# Patient Record
Sex: FEMALE | Race: WHITE | NOT HISPANIC OR LATINO | Employment: OTHER | URBAN - METROPOLITAN AREA
[De-identification: names, ages, dates, MRNs, and addresses within clinical notes are randomized per-mention and may not be internally consistent; named-entity substitution may affect disease eponyms.]

---

## 2018-07-03 ENCOUNTER — TRANSCRIBE ORDERS (OUTPATIENT)
Dept: ADMINISTRATIVE | Facility: HOSPITAL | Age: 83
End: 2018-07-03

## 2018-07-03 DIAGNOSIS — M81.0 OSTEOPOROSIS, UNSPECIFIED OSTEOPOROSIS TYPE, UNSPECIFIED PATHOLOGICAL FRACTURE PRESENCE: Primary | ICD-10-CM

## 2018-07-17 ENCOUNTER — HOSPITAL ENCOUNTER (OUTPATIENT)
Dept: BONE DENSITY | Facility: MEDICAL CENTER | Age: 83
Discharge: HOME | End: 2018-07-17
Payer: COMMERCIAL

## 2018-07-17 DIAGNOSIS — M81.0 OSTEOPOROSIS, UNSPECIFIED OSTEOPOROSIS TYPE, UNSPECIFIED PATHOLOGICAL FRACTURE PRESENCE: ICD-10-CM

## 2018-07-17 PROCEDURE — 77080 DXA BONE DENSITY AXIAL: CPT

## 2020-01-21 ENCOUNTER — APPOINTMENT (EMERGENCY)
Dept: CT IMAGING | Facility: HOSPITAL | Age: 85
End: 2020-01-21
Payer: COMMERCIAL

## 2020-01-21 ENCOUNTER — HOSPITAL ENCOUNTER (OUTPATIENT)
Facility: HOSPITAL | Age: 85
Setting detail: OBSERVATION
Discharge: HOME | End: 2020-01-22
Attending: EMERGENCY MEDICINE | Admitting: HOSPITALIST
Payer: COMMERCIAL

## 2020-01-21 DIAGNOSIS — I16.0 HYPERTENSIVE URGENCY: ICD-10-CM

## 2020-01-21 DIAGNOSIS — E16.2 HYPOGLYCEMIA: ICD-10-CM

## 2020-01-21 DIAGNOSIS — F02.80 LATE ONSET ALZHEIMER'S DISEASE WITHOUT BEHAVIORAL DISTURBANCE (HCC): ICD-10-CM

## 2020-01-21 DIAGNOSIS — D32.9 MENINGIOMA (HCC): ICD-10-CM

## 2020-01-21 DIAGNOSIS — G93.40 ACUTE ENCEPHALOPATHY: ICD-10-CM

## 2020-01-21 DIAGNOSIS — G30.1 LATE ONSET ALZHEIMER'S DISEASE WITHOUT BEHAVIORAL DISTURBANCE (HCC): ICD-10-CM

## 2020-01-21 DIAGNOSIS — R41.82 AMS (ALTERED MENTAL STATUS): Primary | ICD-10-CM

## 2020-01-21 LAB
ANION GAP SERPL CALCULATED.3IONS-SCNC: 10 MMOL/L (ref 4–13)
BACTERIA UR QL AUTO: ABNORMAL /HPF
BASOPHILS # BLD AUTO: 0.02 THOUSANDS/ΜL (ref 0–0.1)
BASOPHILS NFR BLD AUTO: 0 % (ref 0–1)
BILIRUB UR QL STRIP: NEGATIVE
BUN SERPL-MCNC: 29 MG/DL (ref 5–25)
CALCIUM SERPL-MCNC: 8.6 MG/DL (ref 8.3–10.1)
CHLORIDE SERPL-SCNC: 103 MMOL/L (ref 100–108)
CLARITY UR: CLEAR
CO2 SERPL-SCNC: 29 MMOL/L (ref 21–32)
COLOR UR: YELLOW
CREAT SERPL-MCNC: 1.17 MG/DL (ref 0.6–1.3)
EOSINOPHIL # BLD AUTO: 0.1 THOUSAND/ΜL (ref 0–0.61)
EOSINOPHIL NFR BLD AUTO: 1 % (ref 0–6)
ERYTHROCYTE [DISTWIDTH] IN BLOOD BY AUTOMATED COUNT: 14.3 % (ref 11.6–15.1)
GFR SERPL CREATININE-BSD FRML MDRD: 42 ML/MIN/1.73SQ M
GLUCOSE SERPL-MCNC: 115 MG/DL (ref 65–140)
GLUCOSE SERPL-MCNC: 152 MG/DL (ref 65–140)
GLUCOSE SERPL-MCNC: 87 MG/DL (ref 65–140)
GLUCOSE UR STRIP-MCNC: NEGATIVE MG/DL
HCT VFR BLD AUTO: 39.8 % (ref 34.8–46.1)
HGB BLD-MCNC: 12 G/DL (ref 11.5–15.4)
HGB UR QL STRIP.AUTO: ABNORMAL
IMM GRANULOCYTES # BLD AUTO: 0.05 THOUSAND/UL (ref 0–0.2)
IMM GRANULOCYTES NFR BLD AUTO: 0 % (ref 0–2)
KETONES UR STRIP-MCNC: NEGATIVE MG/DL
LEUKOCYTE ESTERASE UR QL STRIP: NEGATIVE
LYMPHOCYTES # BLD AUTO: 1.65 THOUSANDS/ΜL (ref 0.6–4.47)
LYMPHOCYTES NFR BLD AUTO: 15 % (ref 14–44)
MCH RBC QN AUTO: 26 PG (ref 26.8–34.3)
MCHC RBC AUTO-ENTMCNC: 30.2 G/DL (ref 31.4–37.4)
MCV RBC AUTO: 86 FL (ref 82–98)
MONOCYTES # BLD AUTO: 0.72 THOUSAND/ΜL (ref 0.17–1.22)
MONOCYTES NFR BLD AUTO: 6 % (ref 4–12)
NEUTROPHILS # BLD AUTO: 8.73 THOUSANDS/ΜL (ref 1.85–7.62)
NEUTS SEG NFR BLD AUTO: 78 % (ref 43–75)
NITRITE UR QL STRIP: NEGATIVE
NON-SQ EPI CELLS URNS QL MICRO: ABNORMAL /HPF
NRBC BLD AUTO-RTO: 0 /100 WBCS
PH UR STRIP.AUTO: 7 [PH] (ref 4.5–8)
PLATELET # BLD AUTO: 183 THOUSANDS/UL (ref 149–390)
PMV BLD AUTO: 10.5 FL (ref 8.9–12.7)
POTASSIUM SERPL-SCNC: 3.8 MMOL/L (ref 3.5–5.3)
PROT UR STRIP-MCNC: ABNORMAL MG/DL
RBC # BLD AUTO: 4.61 MILLION/UL (ref 3.81–5.12)
RBC #/AREA URNS AUTO: ABNORMAL /HPF
SODIUM SERPL-SCNC: 142 MMOL/L (ref 136–145)
SP GR UR STRIP.AUTO: 1.02 (ref 1–1.03)
UROBILINOGEN UR QL STRIP.AUTO: 0.2 E.U./DL
WBC # BLD AUTO: 11.27 THOUSAND/UL (ref 4.31–10.16)
WBC #/AREA URNS AUTO: ABNORMAL /HPF

## 2020-01-21 PROCEDURE — 85025 COMPLETE CBC W/AUTO DIFF WBC: CPT | Performed by: EMERGENCY MEDICINE

## 2020-01-21 PROCEDURE — 96374 THER/PROPH/DIAG INJ IV PUSH: CPT

## 2020-01-21 PROCEDURE — 36415 COLL VENOUS BLD VENIPUNCTURE: CPT | Performed by: EMERGENCY MEDICINE

## 2020-01-21 PROCEDURE — 96361 HYDRATE IV INFUSION ADD-ON: CPT

## 2020-01-21 PROCEDURE — 96375 TX/PRO/DX INJ NEW DRUG ADDON: CPT

## 2020-01-21 PROCEDURE — 99220 PR INITIAL OBSERVATION CARE/DAY 70 MINUTES: CPT | Performed by: HOSPITALIST

## 2020-01-21 PROCEDURE — 80048 BASIC METABOLIC PNL TOTAL CA: CPT | Performed by: EMERGENCY MEDICINE

## 2020-01-21 PROCEDURE — 96376 TX/PRO/DX INJ SAME DRUG ADON: CPT

## 2020-01-21 PROCEDURE — 99284 EMERGENCY DEPT VISIT MOD MDM: CPT | Performed by: EMERGENCY MEDICINE

## 2020-01-21 PROCEDURE — 70450 CT HEAD/BRAIN W/O DYE: CPT

## 2020-01-21 PROCEDURE — 82948 REAGENT STRIP/BLOOD GLUCOSE: CPT

## 2020-01-21 PROCEDURE — 81001 URINALYSIS AUTO W/SCOPE: CPT

## 2020-01-21 PROCEDURE — 99285 EMERGENCY DEPT VISIT HI MDM: CPT

## 2020-01-21 RX ORDER — CLOPIDOGREL BISULFATE 75 MG/1
75 TABLET ORAL DAILY
COMMUNITY

## 2020-01-21 RX ORDER — LABETALOL 20 MG/4 ML (5 MG/ML) INTRAVENOUS SYRINGE
10 ONCE
Status: COMPLETED | OUTPATIENT
Start: 2020-01-21 | End: 2020-01-21

## 2020-01-21 RX ORDER — LANOLIN ALCOHOL/MO/W.PET/CERES
3 CREAM (GRAM) TOPICAL
COMMUNITY

## 2020-01-21 RX ORDER — ISOSORBIDE MONONITRATE 20 MG/1
30 TABLET ORAL 2 TIMES DAILY
COMMUNITY

## 2020-01-21 RX ORDER — FLUOXETINE HYDROCHLORIDE 20 MG/1
20 CAPSULE ORAL DAILY
COMMUNITY

## 2020-01-21 RX ORDER — HYDRALAZINE HYDROCHLORIDE 20 MG/ML
10 INJECTION INTRAMUSCULAR; INTRAVENOUS ONCE
Status: COMPLETED | OUTPATIENT
Start: 2020-01-21 | End: 2020-01-21

## 2020-01-21 RX ORDER — ALENDRONATE SODIUM 70 MG/1
70 TABLET ORAL
COMMUNITY

## 2020-01-21 RX ORDER — POLYETHYLENE GLYCOL 4500 100 %
17 POWDER (GRAM) MISCELLANEOUS DAILY
COMMUNITY

## 2020-01-21 RX ORDER — ATORVASTATIN CALCIUM 40 MG/1
20 TABLET, FILM COATED ORAL DAILY
COMMUNITY

## 2020-01-21 RX ORDER — MELATONIN
2000 DAILY
COMMUNITY

## 2020-01-21 RX ORDER — TRAZODONE HYDROCHLORIDE 50 MG/1
50 TABLET ORAL
COMMUNITY

## 2020-01-21 RX ORDER — QUETIAPINE FUMARATE 25 MG/1
25 TABLET, FILM COATED ORAL
COMMUNITY

## 2020-01-21 RX ORDER — METOPROLOL SUCCINATE 25 MG/1
25 TABLET, EXTENDED RELEASE ORAL DAILY
COMMUNITY

## 2020-01-21 RX ADMIN — SODIUM CHLORIDE 1000 ML: 0.9 INJECTION, SOLUTION INTRAVENOUS at 19:18

## 2020-01-21 RX ADMIN — HYDRALAZINE HYDROCHLORIDE 10 MG: 20 INJECTION INTRAMUSCULAR; INTRAVENOUS at 20:48

## 2020-01-21 RX ADMIN — HYDRALAZINE HYDROCHLORIDE 10 MG: 20 INJECTION INTRAMUSCULAR; INTRAVENOUS at 19:19

## 2020-01-21 RX ADMIN — LABETALOL HYDROCHLORIDE 10 MG: 5 INJECTION INTRAVENOUS at 22:55

## 2020-01-22 VITALS
OXYGEN SATURATION: 98 % | SYSTOLIC BLOOD PRESSURE: 155 MMHG | TEMPERATURE: 98.6 F | WEIGHT: 133.6 LBS | BODY MASS INDEX: 22.81 KG/M2 | HEIGHT: 64 IN | DIASTOLIC BLOOD PRESSURE: 72 MMHG | RESPIRATION RATE: 16 BRPM | HEART RATE: 74 BPM

## 2020-01-22 LAB
GLUCOSE SERPL-MCNC: 130 MG/DL (ref 65–140)
PLATELET # BLD AUTO: 167 THOUSANDS/UL (ref 149–390)
PMV BLD AUTO: 9.8 FL (ref 8.9–12.7)

## 2020-01-22 PROCEDURE — 82948 REAGENT STRIP/BLOOD GLUCOSE: CPT

## 2020-01-22 PROCEDURE — 99204 OFFICE O/P NEW MOD 45 MIN: CPT | Performed by: PSYCHIATRY & NEUROLOGY

## 2020-01-22 PROCEDURE — 97163 PT EVAL HIGH COMPLEX 45 MIN: CPT

## 2020-01-22 PROCEDURE — 99217 PR OBSERVATION CARE DISCHARGE MANAGEMENT: CPT | Performed by: INTERNAL MEDICINE

## 2020-01-22 PROCEDURE — 36415 COLL VENOUS BLD VENIPUNCTURE: CPT | Performed by: HOSPITALIST

## 2020-01-22 PROCEDURE — 85049 AUTOMATED PLATELET COUNT: CPT | Performed by: HOSPITALIST

## 2020-01-22 RX ORDER — FLUOXETINE HYDROCHLORIDE 20 MG/1
20 CAPSULE ORAL DAILY
Status: DISCONTINUED | OUTPATIENT
Start: 2020-01-22 | End: 2020-01-22 | Stop reason: HOSPADM

## 2020-01-22 RX ORDER — QUETIAPINE FUMARATE 25 MG/1
25 TABLET, FILM COATED ORAL
Status: DISCONTINUED | OUTPATIENT
Start: 2020-01-22 | End: 2020-01-22

## 2020-01-22 RX ORDER — NIFEDIPINE 30 MG/1
30 TABLET, FILM COATED, EXTENDED RELEASE ORAL DAILY
Qty: 30 TABLET | Refills: 0
Start: 2020-01-23 | End: 2020-01-22

## 2020-01-22 RX ORDER — METOPROLOL SUCCINATE 25 MG/1
25 TABLET, EXTENDED RELEASE ORAL DAILY
Status: DISCONTINUED | OUTPATIENT
Start: 2020-01-22 | End: 2020-01-22 | Stop reason: HOSPADM

## 2020-01-22 RX ORDER — ATORVASTATIN CALCIUM 20 MG/1
20 TABLET, FILM COATED ORAL DAILY
Status: DISCONTINUED | OUTPATIENT
Start: 2020-01-22 | End: 2020-01-22 | Stop reason: HOSPADM

## 2020-01-22 RX ORDER — NIFEDIPINE 30 MG/1
30 TABLET, EXTENDED RELEASE ORAL DAILY
Status: DISCONTINUED | OUTPATIENT
Start: 2020-01-22 | End: 2020-01-22 | Stop reason: HOSPADM

## 2020-01-22 RX ORDER — POLYETHYLENE GLYCOL 3350 17 G/17G
17 POWDER, FOR SOLUTION ORAL DAILY
Status: DISCONTINUED | OUTPATIENT
Start: 2020-01-22 | End: 2020-01-22 | Stop reason: HOSPADM

## 2020-01-22 RX ORDER — TRAZODONE HYDROCHLORIDE 50 MG/1
50 TABLET ORAL
Status: DISCONTINUED | OUTPATIENT
Start: 2020-01-22 | End: 2020-01-22 | Stop reason: HOSPADM

## 2020-01-22 RX ORDER — HALOPERIDOL 5 MG/ML
5 INJECTION INTRAMUSCULAR EVERY 6 HOURS PRN
Status: DISCONTINUED | OUTPATIENT
Start: 2020-01-22 | End: 2020-01-22 | Stop reason: HOSPADM

## 2020-01-22 RX ORDER — OLANZAPINE 5 MG/1
5 TABLET ORAL ONCE
Status: DISCONTINUED | OUTPATIENT
Start: 2020-01-22 | End: 2020-01-22 | Stop reason: HOSPADM

## 2020-01-22 RX ORDER — HYDRALAZINE HYDROCHLORIDE 20 MG/ML
10 INJECTION INTRAMUSCULAR; INTRAVENOUS EVERY 6 HOURS PRN
Status: DISCONTINUED | OUTPATIENT
Start: 2020-01-22 | End: 2020-01-22 | Stop reason: HOSPADM

## 2020-01-22 RX ORDER — NIFEDIPINE 30 MG/1
30 TABLET, FILM COATED, EXTENDED RELEASE ORAL DAILY
Qty: 30 TABLET | Refills: 0 | Status: SHIPPED | OUTPATIENT
Start: 2020-01-23

## 2020-01-22 RX ORDER — ISOSORBIDE MONONITRATE 30 MG/1
30 TABLET, EXTENDED RELEASE ORAL DAILY
Status: DISCONTINUED | OUTPATIENT
Start: 2020-01-22 | End: 2020-01-22 | Stop reason: HOSPADM

## 2020-01-22 RX ORDER — CLOPIDOGREL BISULFATE 75 MG/1
75 TABLET ORAL DAILY
Status: DISCONTINUED | OUTPATIENT
Start: 2020-01-22 | End: 2020-01-22 | Stop reason: HOSPADM

## 2020-01-22 RX ORDER — SODIUM CHLORIDE 9 MG/ML
125 INJECTION, SOLUTION INTRAVENOUS CONTINUOUS
Status: DISCONTINUED | OUTPATIENT
Start: 2020-01-22 | End: 2020-01-22 | Stop reason: HOSPADM

## 2020-01-22 RX ADMIN — ENOXAPARIN SODIUM 40 MG: 40 INJECTION SUBCUTANEOUS at 14:57

## 2020-01-22 RX ADMIN — NIFEDIPINE 30 MG: 30 TABLET, FILM COATED, EXTENDED RELEASE ORAL at 11:55

## 2020-01-22 RX ADMIN — CLOPIDOGREL BISULFATE 75 MG: 75 TABLET ORAL at 11:53

## 2020-01-22 RX ADMIN — HYDRALAZINE HYDROCHLORIDE 10 MG: 20 INJECTION INTRAMUSCULAR; INTRAVENOUS at 09:36

## 2020-01-22 RX ADMIN — SODIUM CHLORIDE 125 ML/HR: 0.9 INJECTION, SOLUTION INTRAVENOUS at 02:16

## 2020-01-22 RX ADMIN — ATORVASTATIN CALCIUM 20 MG: 20 TABLET, FILM COATED ORAL at 11:54

## 2020-01-22 RX ADMIN — ISOSORBIDE MONONITRATE 30 MG: 30 TABLET, EXTENDED RELEASE ORAL at 11:57

## 2020-01-22 RX ADMIN — METOPROLOL SUCCINATE 25 MG: 25 TABLET, EXTENDED RELEASE ORAL at 09:04

## 2020-01-22 RX ADMIN — HYDRALAZINE HYDROCHLORIDE 10 MG: 20 INJECTION INTRAMUSCULAR; INTRAVENOUS at 02:30

## 2020-01-22 RX ADMIN — HALOPERIDOL LACTATE 5 MG: 5 INJECTION INTRAMUSCULAR at 10:07

## 2020-01-22 RX ADMIN — POLYETHYLENE GLYCOL 3350 17 G: 17 POWDER, FOR SOLUTION ORAL at 12:09

## 2020-01-22 RX ADMIN — FLUOXETINE 20 MG: 20 CAPSULE ORAL at 11:52

## 2020-01-22 NOTE — ED NOTES
Pts family states pt is willing to take PO medications again  Dr Rosales Greene aware and OK with patient receiving 0900 meds at this time  Pt cooperative and no longer agitated at this time        Ginny Dejesus RN  01/22/20 8459

## 2020-01-22 NOTE — ASSESSMENT & PLAN NOTE
-this is likely due to progression of the patient's dementia  I explained the natural course of dementia to the patient's family at bedside  The patient's hypoglycemia, blood glucose around 50, also could have contributed  -CBC and BMP unremarkable  -CT brain: There is an approximately 2 3 x 1 4 cm mass to the left of the moriah, as described above   Please see discussion   This likely represents a meningioma   Nonemergent outpatient MRI is recommended for further evaluation, if there are no contraindications  There is moderate microangiopathic change

## 2020-01-22 NOTE — CONSULTS
Consultation - Neurology   Jane Sanchez 80 y o  female MRN: [id number]  Unit/Bed#: ED 25 Encounter: [id number]      Assessment/Plan   Assessment:  Jane Sanchez is a 80 y o   female with HTN, HLD, osteoporosis, prior stroke, and vascular dementia who presented to [facility] ED on 01/21/2020 for AMS and hypoglycemia  Neurology was consulted for AMS and incidental meningioma  On exam patient has dementia, an action tremor in bilateral upper extremities, clonus in bilateral knees and ankles, but is back to her neurologic baseline  Plan:  - CT head: There is an approximately 2 3 x 1 4 cm mass to the left of the moriah, as described above  Please see discussion  This likely represents a meningioma  Nonemergent outpatient MRI is recommended for further evaluation, if there are no contraindications  There is moderate microangiopathic change  - outpatient follow-up with Neurology for prior stroke, vascular dementia, and incidental meningioma  - medical management and supportive care per primary team   Correction of any metabolic infectious disturbances  - no further neurology inpatient recommendations      History of Present Illness     Reason for Consult / Principal Problem: AMS, meningioma, acute encephalopathy  Hx and PE limited by: N/A  HPI: Jane Sanchez is a 80 y o   female with HTN, HLD, osteoporosis, prior stroke, and vascular dementia who presented to [facility] ED on 01/21/2020 for AMS and hypoglycemia  Per chart review and family at bedside, patient is confused and has vascular dementia at baseline, but staff at [facility], her assisted living home, noticed that she seemed more confused yesterday  Per EMS, patient's glucose was 50  She was given an amp of dextrose, which improved her mental status  Patient's family arrived to the ED around 7:30 p m  Last night    At first, the patient was acting normally, however she suddenly started shaking in all extremities and could not speak  Patient's daughter at bedside states that this shaking looked like she was cold or nervous"and quickly resolved after giving her daughter   Patient thought that she had been kidnapped  Per daughter, this episode occurred right after the patient was starting to fall asleep, but then patient woke up confused  Daughter states that the patient's breathing was fast, but her eyes were focused and did not roll backwards  Since this episode, the patient has been back at her neurologic baseline  CT head revealed a 2 3 x 1 4 cm mass to the left the moriah, likely representing a meningioma, but no acute intracranial abnormalities  While in the ED, the patient has been hypertensive, BUN was increased, but glucose has been stable  Patient lives with her  at an assisted living home  She needs assistance with cleaning the house and cooking  She had a stroke 3 years ago, and at that time she was diagnosed with vascular dementia  When she was admitted during that ED visit, patient experienced a similar episode of thinking she was kidnapped  On exam, patient is pleasantly confused  Family at bedside  Patient feels like she is back to her baseline, and family agrees  Patient denies any headache, visual disturbances, arm/leg weakness, numbness, tingling, chest pain, shortness of breath, and abdominal pain  Patient called her daughter by the wrong name and could not remember her daughter's birthday  She has an action tremor in bilateral UEs with finger-to-nose and bilateral knee and ankle clonus  Inpatient consult to Neurology  Consult performed by: Matt Krishnamurthy PA-C  Consult ordered by: Sofya Lofton DO          Review of Systems   12 point ROS performed, as stated above, all others negative  Historical Information   Past Medical History:   Diagnosis Date    Depression     Hyperlipidemia     Hypertension     Osteoporosis     Stroke [facility])      History reviewed   No pertinent surgical history  Social History   Social History     Substance and Sexual Activity   Alcohol Use Not Currently     Social History     Substance and Sexual Activity   Drug Use Never     Social History     Tobacco Use   Smoking Status Never Smoker     Family History: non-contributory    Review of previous medical records was completed  CT head, prior notes, and labs    Meds/Allergies   all current active meds have been reviewed and current meds:   Current Facility-Administered Medications   Medication Dose Route Frequency    atorvastatin (LIPITOR) tablet 20 mg  20 mg Oral Daily    clopidogrel (PLAVIX) tablet 75 mg  75 mg Oral Daily    enoxaparin (LOVENOX) subcutaneous injection 40 mg  40 mg Subcutaneous Daily    FLUoxetine (PROzac) capsule 20 mg  20 mg Oral Daily    haloperidol lactate (HALDOL) injection 5 mg  5 mg Intramuscular Q6H PRN    hydrALAZINE (APRESOLINE) injection 10 mg  10 mg Intravenous Q6H PRN    isosorbide mononitrate (IMDUR) 24 hr tablet 30 mg  30 mg Oral Daily    metoprolol succinate (TOPROL-XL) 24 hr tablet 25 mg  25 mg Oral Daily    NIFEdipine (PROCARDIA XL) 24 hr tablet 30 mg  30 mg Oral Daily    OLANZapine (ZyPREXA) tablet 5 mg  5 mg Oral Once    polyethylene glycol (MIRALAX) packet 17 g  17 g Oral Daily    sodium chloride 0 9 % infusion  125 mL/hr Intravenous Continuous    traZODone (DESYREL) tablet 50 mg  50 mg Oral HS       No Known Allergies    Objective   Vitals:Blood pressure (!) 175/76, pulse 66, temperature 98 6 °F (37 °C), temperature source Temporal, resp  rate 18, height 5' 4" (1 626 m), weight 60 6 kg (133 lb 9 6 oz), SpO2 97 %  ,Body mass index is 22 93 kg/m²  Intake/Output Summary (Last 24 hours) at 1/22/2020 1259  Last data filed at 1/21/2020 2045  Gross per 24 hour   Intake 1250 ml   Output    Net 1250 ml       Invasive Devices:    Invasive Devices     Peripheral Intravenous Line            Peripheral IV 01/21/20 Right Antecubital 1 day    Peripheral IV 01/22/20 Left Antecubital less than 1 day                Physical Exam   Constitutional: She appears well-developed and well-nourished  No distress  Elderly and frail female lying comfortably in bed  Pleasantly confused  HENT:   Head: Normocephalic and atraumatic  Right Ear: External ear normal    Left Ear: External ear normal    Nose: Nose normal    Mouth/Throat: Oropharynx is clear and moist  No oropharyngeal exudate  Eyes: Pupils are equal, round, and reactive to light  Conjunctivae and EOM are normal    Neck: Normal range of motion  Neck supple  Cardiovascular: Normal rate and regular rhythm  Pulmonary/Chest: Effort normal    Abdominal: Soft  Musculoskeletal: Normal range of motion  Neurological: She is alert  Skin: Skin is warm and dry  Capillary refill takes less than 2 seconds  She is not diaphoretic  Psychiatric: She has a normal mood and affect  Her behavior is normal  Judgment and thought content normal    Nursing note and vitals reviewed  Neurologic Exam     Mental Status   Patient alert and oriented to person, place, and city  Patient called her daughter by the wrong name and could not remember her daughter's birthday  Normal attention and concentration  Able to repeat and name objects on the [employer] cards  No dysarthria or aphasia  Cranial Nerves     CN II   Visual acuity: decreased  Right visual field deficit: none  Left visual field deficit: none     CN III, IV, VI   Pupils are equal, round, and reactive to light  Extraocular motions are normal    Right pupil: Size: 3 mm  Shape: regular  Reactivity: brisk  Consensual response: intact  Left pupil: Size: 3 mm  Shape: regular  Reactivity: brisk  Consensual response: intact  Nystagmus: none   Diplopia: none    CN V   Facial sensation intact  CN VII   Facial expression full, symmetric  CN VIII   Hearing impaired: Chronically impaired worse on the right      CN IX, X   Palate: symmetric    CN XI   Right sternocleidomastoid strength: normal  Left sternocleidomastoid strength: normal  Right trapezius strength: normal  Left trapezius strength: normal    CN XII   Tongue: not atrophic  Fasciculations: absent  Tongue deviation: none    Motor Exam   Muscle bulk: normal  Overall muscle tone: normal  Right arm pronator drift: absent  Left arm pronator drift: absent  5-/5 left deltoid strength  5/5 bilateral triceps and biceps  5-/5 bilateral hip flexors, 5/5 dorsiflexion and plantar flexion bilaterally  Sensory Exam   Sensation to light touch, temperature, and vibration intact except decreased vibration at bilateral toes  Gait, Coordination, and Reflexes     Gait  Gait: (Deferred for patient's safety)Action tremor with finger-to-nose bilaterally  2+ reflexes throughout except knee and ankle clonus and knee jerk bilaterally  Normal Babinski with downward toes  Lab Results:   I have personally reviewed pertinent reports  , CBC:   Results from last 7 days   Lab Units 01/22/20  0517 01/21/20  1920   WBC Thousand/uL  --  11 27*   RBC Million/uL  --  4 61   HEMOGLOBIN g/dL  --  12 0   HEMATOCRIT %  --  39 8   MCV fL  --  86   PLATELETS Thousands/uL 167 183   , BMP/CMP:   Results from last 7 days   Lab Units 01/21/20  1920   SODIUM mmol/L 142   POTASSIUM mmol/L 3 8   CHLORIDE mmol/L 103   CO2 mmol/L 29   BUN mg/dL 29*   CREATININE mg/dL 1 17   CALCIUM mg/dL 8 6   EGFR ml/min/1 73sq m 42    Urinalysis:   Results from last 7 days   Lab Units 01/21/20  1926   COLOR UA  Yellow   CLARITY UA  Clear   SPEC GRAV UA  1 020   PH UA  7 0   LEUKOCYTES UA  Negative   NITRITE UA  Negative   GLUCOSE UA mg/dl Negative   KETONES UA mg/dl Negative   BILIRUBIN UA  Negative   BLOOD UA  Trace*     Imaging Studies: I have personally reviewed pertinent reports  and I have personally reviewed pertinent films in PACS  EKG, Pathology, and Other Studies: I have personally reviewed pertinent reports     and I have personally reviewed pertinent films in PACS  VTE Prophylaxis: Sequential compression device (Venodyne)

## 2020-01-22 NOTE — DISCHARGE INSTRUCTIONS
Alzheimer Disease, Ambulatory Care   GENERAL INFORMATION:   Alzheimer disease  is an irreversible brain disorder that results in the gradual loss of memory  In Alzheimer disease (AD), parts of the brain die and cannot make normal levels of brain chemicals  The disease usually starts at about age 72 to 79 years but can start earlier  Common symptoms include the following:   · Mild AD:  Early AD symptoms may be minor and last from 1 to 3 years       ¨ Memory loss: Remembering what happened years ago, but unable to remember things from yesterday or forgetting the names of common things or people you know    ¨ Confusion about what month or season it is    ¨ Forgetting to brush your teeth or comb your hair    ¨ Difficulty taking care of your home or finances or difficulty making decisions    ¨ Loss of interest in your usual activities    ¨ Feeling depressed, angry, or confused about the changes you notice    · Moderate AD:      ¨ Problems choosing what clothes to wear, doing simple jobs, or caring for your self    ¨ Unable to recognize people familiar to you    ¨ Difficulty finding words to say what you mean, trouble talking in normal sentences, or speech that is difficult to understand    ¨ Feeling anxious, restless, and agitated at night and seeming depressed or worried    ¨ Difficulty controlling emotions and becoming loud, violent, and hard to control    ¨ Becoming confused and wandering off or pacing    ¨ Unable to plan and follow through with activities    ¨ Thinking something is true even though it is not, seeing things that are not actually there, or inability to control when you urinate    · Severe AD:      ¨ Complete loss of memory    ¨ Complete loss of speech    ¨ Loss of bladder and bowel control    ¨ Finding it very difficult to walk    ¨ Becoming angry and out of control or aggressive and destroying things    ¨ Unable to care for yourself and needing someone to take care of you  Contact a primary healthcare provider if:   · The person you are caring for:    ¨ Has a fever    ¨ Has a rash or his skin is itchy and swollen    ¨ Appears depressed and has difficulty coping with his symptoms    · You have questions or concerns about his condition or the care he is receiving  Treatment for Alzheimer disease  may include medicines to help increase the amount of normal chemicals in your brain or slow the death of brain cells  You may also need medicines to help control your mood or help you feel less nervous or restless  Medicines to help with bladder and bowel control or to help you sleep better may be needed  Some people may be given medicines to control delusions (false beliefs), hallucinations, or violent behaviors  Counseling can teach you ways to cope with your disease  Care for someone who has Alzheimer disease:   · Keep activities the same from day to day  People with AD do best with daily routines  Take breaks often  Save difficult activities for when the person seems the most alert  Choose activities that the person is interested in doing  Help the person get started and try to break difficult activities into small steps  · Keep mealtimes at the same time each day  It is best to limit food choices  Eating patterns may change in people with AD  It may help to have the person eat small meals and snacks  Ask healthcare providers about giving vitamins if you do not think the person is eating enough  · Get regular exercise  Regular exercise may help decrease depression and anxiety and improve sleep  Walking is a good exercise for people with AD  Check local senior centers for information about group exercise activities  · Learn to recognize pain or discomfort  Noisy breathing or rigid body movements may be a sign of pain  A sad or scared look may also mean the person is having discomfort  The person may also constantly make certain noises when he or she is in pain  · Provide personal care    Make sure to check the water temperature of the bath or shower so they do not burn themselves  It may help to choose and lay out clothes each night for them to wear the next day  Make sure to brush the person's teeth or dentures daily  Do not forget to take the person to the dentist for exams  · Provide a safe environment  Go through the house and remove things that could cause accidents  Make sure household  and medicines are out of reach  You may need to remove the stove burner knobs and hide matches and lighters to prevent injury  Remove loose rugs to prevent falls  Keep doors and windows tightly closed to prevent wandering or other injuries  If the person smokes, make sure cigarettes are always put out  · Keep the person with AD active and awake during the day  Limit how much liquid the person drinks in the evening  This may help him or her sleep through the night  Make sure that the person goes to bed at the same time every night  · Use short words or sentences when speaking to the person with AD  Call the person by name and speak slowly, clearly, and calmly  Use short words and sentences  Use the same words if you have to repeat yourself  Do not ask too many questions  Do not argue with the person  · Implement a bathroom schedule  This may mean reminding the person to urinate every 4 hours  Be aware of the person's bowel movement routine and keep it the same  · Prevent wandering  New door locks may be needed to keep the person from going outside alone  An alarm system near doors may tell you if the person wanders out  Have the person wear a necklace or bracelet with their name and phone number on it in case they wander away from you and are found by someone else  Follow up with your healthcare provider as directed:  Ask someone to go with you to help you remember what your healthcare provider tells you  The person can take notes for you during the visit and go over the notes with you later  Write down your questions so you remember to ask them during your visits  CARE AGREEMENT:   You have the right to help plan your care  Learn about your health condition and how it may be treated  Discuss treatment options with your caregivers to decide what care you want to receive  You always have the right to refuse treatment  The above information is an  only  It is not intended as medical advice for individual conditions or treatments  Talk to your doctor, nurse or pharmacist before following any medical regimen to see if it is safe and effective for you  © 2014 [address] is for End User's use only and may not be sold, redistributed or otherwise used for commercial purposes  All illustrations and images included in CareNotes® are the copyrighted property of A D A M [employer] Inc  or [employer]  [facility]   WHAT YOU NEED TO KNOW:   Hypertension is high blood pressure (BP)  Your BP is the force of your blood moving against the walls of your arteries  Normal BP is less than 120/80  Prehypertension is between 120/80 and 139/89  Hypertension is 140/90 or higher  Hypertension causes your BP to get so high that your heart has to work much harder than normal  This can damage your heart  You can control hypertension with a healthy lifestyle or medicines  A controlled blood pressure helps protect your organs, such as your heart, lungs, brain, and kidneys  DISCHARGE INSTRUCTIONS:   Call 911 for any of the following:   · You have discomfort in your chest that feels like squeezing, pressure, fullness, or pain  · You become confused or have difficulty speaking  · You suddenly feel lightheaded or have trouble breathing  · You have pain or discomfort in your back, neck, jaw, stomach, or arm  Return to the emergency department if:   · You have a severe headache or vision loss  · You have weakness in an arm or leg    Contact your healthcare provider if: · You feel faint, dizzy, confused, or drowsy  · You have been taking your BP medicine and your BP is still higher than your healthcare provider says it should be  · You have questions or concerns about your condition or care  Medicines: You may  need any of the following:  · Medicine  may be used to help lower your BP  You may need more than one type of medicine  Take the medicine exactly as directed  · Diuretics  help decrease extra fluid that collects in your body  This will help lower your BP  You may urinate more often while you take this medicine  · Cholesterol medicine  helps lower your cholesterol level  A low cholesterol level helps prevent heart disease and makes it easier to control your blood pressure  · Take your medicine as directed  Contact your healthcare provider if you think your medicine is not helping or if you have side effects  Tell him or her if you are allergic to any medicine  Keep a list of the medicines, vitamins, and herbs you take  Include the amounts, and when and why you take them  Bring the list or the pill bottles to follow-up visits  Carry your medicine list with you in case of an emergency  Follow up with your healthcare provider as directed: You will need to return to have your BP checked and to have other lab tests done  Write down your questions so you remember to ask them during your visits  Manage hypertension:  Talk with your healthcare provider about these and other ways to manage hypertension:  · Check your BP at home  Sit and rest for 5 minutes before you take your BP  Extend your arm and support it on a flat surface  Your arm should be at the same level as your heart  Follow the directions that came with your BP monitor  If possible, take at least 2 BP readings each time  Take your BP at least twice a day at the same times each day, such as morning and evening  Keep a record of your BP readings and bring it to your follow-up visits   Ask your healthcare provider what your BP should be  · Limit sodium (salt) as directed  Too much sodium can affect your fluid balance  Check labels to find low-sodium or no-salt-added foods  Some low-sodium foods use potassium salts for flavor  Too much potassium can also cause health problems  Your healthcare provider will tell you how much sodium and potassium are safe for you to have in a day  He or she may recommend that you limit sodium to 2,300 mg a day  · Follow the meal plan recommended by your healthcare provider  A dietitian or your provider can give you more information on low-sodium plans or the DASH (Dietary Approaches to Stop Hypertension) eating plan  The DASH plan is low in sodium, unhealthy fats, and total fat  It is high in potassium, calcium, and fiber  · Exercise to maintain a healthy weight  Exercise at least 30 minutes per day, on most days of the week  This will help decrease your blood pressure  Ask your healthcare provider about the best exercise plan for you  · Decrease stress  This may help lower your BP  Learn ways to relax, such as deep breathing or listening to music  · Limit alcohol  Women should limit alcohol to 1 drink a day  Men should limit alcohol to 2 drinks a day  A drink of alcohol is 12 ounces of beer, 5 ounces of wine, or 1½ ounces of liquor  · Do not smoke  Nicotine and other chemicals in cigarettes and cigars can increase your BP and also cause lung damage  Ask your healthcare provider for information if you currently smoke and need help to quit  E-cigarettes or smokeless tobacco still contain nicotine  Talk to your healthcare provider before you use these products  · Manage any other health conditions you have  Health conditions such as diabetes can increase your risk for hypertension  Follow your healthcare provider's instructions and take all your medicines as directed    © 2017 [address] is for End User's use only and may not be sold, redistributed or otherwise used for commercial purposes  All illustrations and images included in CareNotes® are the copyrighted property of A [employer] M [employer] Inc  or [employer]ishal [employer]it[employer]  The above information is an  only  It is not intended as medical advice for individual conditions or treatments  Talk to your doctor, nurse or pharmacist before following any medical regimen to see if it is safe and effective for you

## 2020-01-22 NOTE — H&P
H&P- Lenny Woody 4/21/1931, 80 y o  female MRN: [id number]    Unit/Bed#: ED 18 Encounter: [id number]    Primary Care Provider: No primary care provider on file  Date and time admitted to hospital: 1/21/2020  6:08 PM        Late onset Alzheimer's disease without behavioral disturbance ([facility] [address] )  Assessment & Plan  -continue Seroquel and trazodone    Hypertensive urgency  Assessment & Plan  -patient received 20 mg of IV hydralazine in the ER and will now be given IV labetalol  -continue Imdur, Toprol-XL  -continue IV hydralazine as needed  -Start Procardia XL 30mg daily    * Acute encephalopathy  Assessment & Plan  -this is likely due to progression of the patient's dementia  I explained the natural course of dementia to the patient's family at bedside  The patient's hypoglycemia, blood glucose around 50, also could have contributed  -CBC and BMP unremarkable  -CT brain: There is an approximately 2 3 x 1 4 cm mass to the left of the moriah, as described above   Please see discussion   This likely represents a meningioma   Nonemergent outpatient MRI is recommended for further evaluation, if there are no contraindications  There is moderate microangiopathic change  VTE Prophylaxis: Enoxaparin (Lovenox)  / sequential compression device   Code Status: FULL  POLST: POLST is not applicable to this patient  Discussion with family: 2 family members at bedside    Anticipated Length of Stay:  Patient will be admitted on an Observation basis with an anticipated length of stay of  < 2 midnights  Justification for Hospital Stay: confusion    Total Time for Visit, including Counseling / Coordination of Care: 45 minutes  Greater than 50% of this total time spent on direct patient counseling and coordination of care  Chief Complaint:   Change in mental status    History of Present Illness:    Lenny Woody is a 80 y o  female who presents with change in mental status    Earlier today the patient's nursing home it was reported that she was unable to speak  She was found have a blood glucose round 15 was given D50W  Her mentation improved to baseline  She then had another episode in the ER where she was having difficulty speaking and this resolved on its own  Patient has dimension is a poor historian  She denies any complaints at this time  I discussed the case with the patient's family at bedside and they are concerned about these episodes of acute encephalopathy  I specifically asked the patient's family about chest pain, shortness of breath, nausea, vomiting, diarrhea, fevers in they denied that the patient has had any of these symptoms  They did report the patient has not been eating as well recently  Review of Systems:    Review of Systems   All other systems reviewed and are negative  Past Medical and Surgical History:     Past Medical History:   Diagnosis Date    Depression     Hyperlipidemia     Hypertension     Osteoporosis     Stroke [facility])        History reviewed  No pertinent surgical history  Meds/Allergies:    Prior to Admission medications    Medication Sig Start Date End Date Taking?  Authorizing Provider   alendronate (FOSAMAX) 70 mg tablet Take 70 mg by mouth every 7 days   Yes Historical Provider, MD   atorvastatin (LIPITOR) 40 mg tablet Take 20 mg by mouth daily   Yes Historical Provider, MD   calcium-vitamin D (OYST-PILY D) 250-125 MG-UNIT per tablet Take 1 tablet by mouth daily   Yes Historical Provider, MD   cholecalciferol (VITAMIN D3) 1,000 units tablet Take 2,000 Units by mouth daily   Yes Historical Provider, MD   clopidogrel (PLAVIX) 75 mg tablet Take 75 mg by mouth daily   Yes Historical Provider, MD   FLUoxetine (PROzac) 20 mg capsule Take 20 mg by mouth daily   Yes Historical Provider, MD   isosorbide mononitrate (ISMO,MONOKET) 20 mg tablet Take 30 mg by mouth 2 (two) times a day   Yes Historical Provider, MD   melatonin 3 mg Take 3 mg by mouth   Yes Historical Provider, MD metoprolol succinate (TOPROL-XL) 25 mg 24 hr tablet Take 25 mg by mouth daily   Yes Historical Provider, MD   Polyethylene Glycol 4500 (BASE D POLYETHYLENE GLYCOL) POWD Take 17 g by mouth daily   Yes Historical Provider, MD   QUEtiapine (SEROquel) 25 mg tablet Take 25 mg by mouth   Yes Historical Provider, MD   traZODone (DESYREL) 50 mg tablet Take 50 mg by mouth   Yes Historical Provider, MD     I have reviewed home medications with a medical source (PCP, Pharmacy, other)  Allergies: No Known Allergies    Social History:     Marital Status: /Civil Union   Substance Use History:   Social History     Substance and Sexual Activity   Alcohol Use Not Currently     Social History     Tobacco Use   Smoking Status Never Smoker     Social History     Substance and Sexual Activity   Drug Use Never       Family History:    non-contributory    Physical Exam:     Vitals:   Blood Pressure: (!) 201/93 (01/21/20 2217)  Pulse: 75 (01/21/20 2217)  Temperature: 98 6 °F (37 °C) (01/21/20 1812)  Temp Source: Temporal (01/21/20 1812)  Respirations: 18 (01/21/20 2217)  Weight - Scale: 60 6 kg (133 lb 9 6 oz) (01/21/20 1812)  SpO2: 97 % (01/21/20 2217)    Physical Exam    Gen: NAD, AAOx1, well developed, well nourished  Eyes: EOMI, PERRLA, no scleral icterus  ENMT:  Oropharynx clear of erythema or exudates, no nasal discharge, no otic discharge, moist mucous membranes  Neck:  Supple  Lymph:  No anterior or posterior cervical or supraclavicular lymphadenopathy  Cardiovascular:  Regular rate and rhythm, normal S1-S2, no murmurs, rubs, or gallops  Lungs:  Clear to auscultation bilaterally, no wheezes, or rales, or rhonchi  Abdomen:  Positive bowel sounds, soft, nontender, nondistended, no palpable organomegaly   Skin:  Intact, no obvious lesions or rashes, no edema  Neuro: Cranial nerves 2-12 are intact, non-focal, moves in all 4 extremities      Additional Data:     Lab Results: I have personally reviewed pertinent reports  Results from last 7 days   Lab Units 01/21/20  1920   WBC Thousand/uL 11 27*   HEMOGLOBIN g/dL 12 0   HEMATOCRIT % 39 8   PLATELETS Thousands/uL 183   NEUTROS PCT % 78*   LYMPHS PCT % 15   MONOS PCT % 6   EOS PCT % 1     Results from last 7 days   Lab Units 01/21/20  1920   SODIUM mmol/L 142   POTASSIUM mmol/L 3 8   CHLORIDE mmol/L 103   CO2 mmol/L 29   BUN mg/dL 29*   CREATININE mg/dL 1 17   ANION GAP mmol/L 10   CALCIUM mg/dL 8 6   GLUCOSE RANDOM mg/dL 115         Results from last 7 days   Lab Units 01/21/20  2124 01/21/20  1813   POC GLUCOSE mg/dl 152* 87               Imaging: I have personally reviewed pertinent reports  CT head without contrast   Final Result by Dinh Unger MD (01/21 2240)      There is an approximately 2 3 x 1 4 cm mass to the left of the moriah, as described above  Please see discussion  This likely represents a meningioma  Nonemergent outpatient MRI is recommended for further evaluation, if there are no contraindications  There is moderate microangiopathic change  I personally discussed this study with Zonia Chew on 1/21/2020 at 10:33 PM                            Workstation performed: [id number]           Allscripts / Epic Records Reviewed: Yes     ** Please Note: This note has been constructed using a voice recognition system   **

## 2020-01-22 NOTE — INCIDENTAL FINDINGS
The following findings require follow up:  Radiographic finding   Finding: There is an approximately 2 3 x 1 4 cm mass to the left of the moriah, as described above  Please see discussion  This likely represents a meningioma  Nonemergent outpatient MRI is recommended for further evaluation, if there are no contraindications      There is moderate microangiopathic change     Follow up required: Yes   Follow up should be done within 1-2 week(s)    Please notify the following clinician to assist with the follow up:   PCP and Neurology

## 2020-01-22 NOTE — PLAN OF CARE
Problem: Potential for Falls  Goal: Patient will remain free of falls  Description  INTERVENTIONS:  - Assess patient frequently for physical needs  -  Identify cognitive and physical deficits and behaviors that affect risk of falls    -  [place] fall precautions as indicated by assessment   - Educate patient/family on patient safety including physical limitations  - Instruct patient to call for assistance with activity based on assessment  - Modify environment to reduce risk of injury  - Consider OT/PT consult to assist with strengthening/mobility  Outcome: Progressing     Problem: Prexisting or High Potential for Compromised Skin Integrity  Goal: Skin integrity is maintained or improved  Description  INTERVENTIONS:  - Identify patients at risk for skin breakdown  - Assess and monitor skin integrity  - Assess and monitor nutrition and hydration status  - Monitor labs   - Assess for incontinence   - Turn and reposition patient  - Assist with mobility/ambulation  - Relieve pressure over bony prominences  - Avoid friction and shearing  - Provide appropriate hygiene as needed including keeping skin clean and dry  - Evaluate need for skin moisturizer/barrier cream  - Collaborate with interdisciplinary team   - Patient/family teaching  - Consider wound care consult   Outcome: Progressing     Problem: PAIN - ADULT  Goal: Verbalizes/displays adequate comfort level or baseline comfort level  Description  Interventions:  - Encourage patient to monitor pain and request assistance  - Assess pain using appropriate pain scale  - Administer analgesics based on type and severity of pain and evaluate response  - Implement non-pharmacological measures as appropriate and evaluate response  - Consider cultural and social influences on pain and pain management  - Notify physician/advanced practitioner if interventions unsuccessful or patient reports new pain  Outcome: Progressing     Problem: SAFETY ADULT  Goal: Maintain or return to baseline ADL function  Description  INTERVENTIONS:  -  Assess patient's ability to carry out ADLs; assess patient's baseline for ADL function and identify physical deficits which impact ability to perform ADLs (bathing, care of mouth/teeth, toileting, grooming, dressing, etc )  - Assess/evaluate cause of self-care deficits   - Assess range of motion  - Assess patient's mobility; develop plan if impaired  - Assess patient's need for assistive devices and provide as appropriate  - Encourage maximum independence but intervene and supervise when necessary  - Involve family in performance of ADLs  - Assess for home care needs following discharge   - Consider OT consult to assist with ADL evaluation and planning for discharge  - Provide patient education as appropriate  Outcome: Progressing  Goal: Maintain or return mobility status to optimal level  Description  INTERVENTIONS:  - Assess patient's baseline mobility status (ambulation, transfers, stairs, etc )    - Identify cognitive and physical deficits and behaviors that affect mobility  - Identify mobility aids required to assist with transfers and/or ambulation (gait belt, sit-to-stand, lift, walker, cane, etc )  - [place] fall precautions as indicated by assessment  - Record patient progress and toleration of activity level on Mobility SBAR; progress patient to next Phase/Stage  - Instruct patient to call for assistance with activity based on assessment  - Consider rehabilitation consult to assist with strengthening/weightbearing, etc   Outcome: Progressing     Problem: DISCHARGE PLANNING  Goal: Discharge to home or other facility with appropriate resources  Description  INTERVENTIONS:  - Identify barriers to discharge w/patient and caregiver  - Arrange for needed discharge resources and transportation as appropriate  - Identify discharge learning needs (meds, wound care, etc )  - Arrange for interpretive services to assist at discharge as needed  - Refer to Case Management Department for coordinating discharge planning if the patient needs post-hospital services based on physician/advanced practitioner order or complex needs related to functional status, cognitive ability, or social support system  Outcome: Progressing     Problem: Knowledge Deficit  Goal: Patient/family/caregiver demonstrates understanding of disease process, treatment plan, medications, and discharge instructions  Description  Complete learning assessment and assess knowledge base  Interventions:  - Provide teaching at level of understanding  - Provide teaching via preferred learning methods  Outcome: Progressing     Problem: Nutrition/Hydration-ADULT  Goal: Nutrient/Hydration intake appropriate for improving, restoring or maintaining nutritional needs  Description  Monitor and assess patient's nutrition/hydration status for malnutrition  Collaborate with interdisciplinary team and initiate plan and interventions as ordered  Monitor patient's weight and dietary intake as ordered or per policy  Utilize nutrition screening tool and intervene as necessary  Determine patient's food preferences and provide high-protein, high-caloric foods as appropriate       INTERVENTIONS:  - Monitor oral intake, urinary output, labs, and treatment plans  - Assess nutrition and hydration status and recommend course of action  - Evaluate amount of meals eaten  - Assist patient with eating if necessary   - Allow adequate time for meals  - Recommend/ encourage appropriate diets, oral nutritional supplements, and vitamin/mineral supplements  - Order, calculate, and assess calorie counts as needed  - Recommend, monitor, and adjust tube feedings and TPN/PPN based on assessed needs  - Assess need for intravenous fluids  - Provide specific nutrition/hydration education as appropriate  - Include patient/family/caregiver in decisions related to nutrition  Outcome: Progressing     Problem: CARDIOVASCULAR - ADULT  Goal: Maintains optimal cardiac output and hemodynamic stability  Description  INTERVENTIONS:  - Monitor I/O, vital signs and rhythm  - Monitor for S/S and trends of decreased cardiac output  - Administer and titrate ordered vasoactive medications to optimize hemodynamic stability  - Assess quality of pulses, skin color and temperature  - Assess for signs of decreased coronary artery perfusion  - Instruct patient to report change in severity of symptoms  Outcome: Progressing  Goal: Absence of cardiac dysrhythmias or at baseline rhythm  Description  INTERVENTIONS:  - Continuous cardiac monitoring, vital signs, obtain 12 lead EKG if ordered  - Administer antiarrhythmic and heart rate control medications as ordered  - Monitor electrolytes and administer replacement therapy as ordered  Outcome: Progressing     Problem: METABOLIC, FLUID AND ELECTROLYTES - ADULT  Goal: Electrolytes maintained within normal limits  Description  INTERVENTIONS:  - Monitor labs and assess patient for signs and symptoms of electrolyte imbalances  - Administer electrolyte replacement as ordered  - Monitor response to electrolyte replacements, including repeat lab results as appropriate  - Instruct patient on fluid and nutrition as appropriate  Outcome: Progressing  Goal: Fluid balance maintained  Description  INTERVENTIONS:  - Monitor labs   - Monitor I/O and WT  - Instruct patient on fluid and nutrition as appropriate  - Assess for signs & symptoms of volume excess or deficit  Outcome: Progressing  Goal: Glucose maintained within target range  Description  INTERVENTIONS:  - Monitor Blood Glucose as ordered  - Assess for signs and symptoms of hyperglycemia and hypoglycemia  - Administer ordered medications to maintain glucose within target range  - Assess nutritional intake and initiate nutrition service referral as needed  Outcome: Progressing     Problem: SKIN/TISSUE INTEGRITY - ADULT  Goal: Skin integrity remains intact  Description  INTERVENTIONS  - Identify patients at risk for skin breakdown  - Assess and monitor skin integrity  - Assess and monitor nutrition and hydration status  - Monitor labs (i e  albumin)  - Assess for incontinence   - Turn and reposition patient  - Assist with mobility/ambulation  - Relieve pressure over bony prominences  - Avoid friction and shearing  - Provide appropriate hygiene as needed including keeping skin clean and dry  - Evaluate need for skin moisturizer/barrier cream  - Collaborate with interdisciplinary team (i e  Nutrition, Rehabilitation, etc )   - Patient/family teaching  Outcome: Progressing

## 2020-01-22 NOTE — SOCIAL WORK
Bebo Ga is an 80years old female who was admitted as a result of hypoglycemic  Met patient and her daughter Roxanne Murrieta [address] discharge plans  Patient was alert no name and place, patient is confused and demented at baseline  EMS reported patient was extremely confused prior to this admission  [facility] [address]  [facility] [place] [place]  Needs assistance with meal preparation, dressing and bathing  Mo use of DME  Patient medical PCP and pharmacy services covered by [facility] and on antidepressants prescribed/monitored by mentioned facility  Family will assist with transportation at d/c  No other issues reported by patient's family  CM/SW to follow  CM reviewed d/c planning process including the following: identifying help at home, patient preference for d/c planning needs  [facility] Meds to Bed program, availability of treatment team to discuss questions or concerns patient and/or family may have regarding understanding medications and recognizing signs and symptoms once discharged  CM also encouraged patient to follow up with all recommended appointments after discharge  Patient advised of importance for patient and family to participate in managing patients medical well being

## 2020-01-22 NOTE — PHYSICAL THERAPY NOTE
PHYSICAL THERAPY EVALUATION          Patient Name: Kori Amezcua  DXRTY'S Date: 1/22/2020   PT EVALUATION    80 y o     [id number]    Hypoglycemia [E16 2]    Past Medical History:   Diagnosis Date    Depression     Hyperlipidemia     Hypertension     Osteoporosis     Stroke [facility])      History reviewed  No pertinent surgical history  01/22/20 1333   Note Type   Note type Eval only   Pain Assessment   Pain Assessment No/denies pain   Pain Score No Pain   Home Living   Type of Home Assisted living  ([facility])   Home Layout One level   [address]; Other (Comment)  (pt  has wc and RW which he uses, also has cane)   Additional Comments home living information provided by pt dtr  pt reside in personal care section of [facility] w    Prior Function   Level of [place] Needs assistance with ADLs and functional mobility; Needs assistance with IADLs   Lives With Spouse; Facility staff   Receives Help From Other (Comment)  (facility staff)   ADL Assistance Needs assistance  (A to shower, Indep w dressing)   IADLs Needs assistance  (A for laundry, cleaninng and meals provided)   Falls in the last 6 months 0   Vocational Retired   Comments PLOF provided by pt dtr  Per dtr, pt amb w/o an AD pta, and is able to push  in wc to and from dining room   is more "cognitively with it" per dtr and helps pt w issues related to cognition  -   Restrictions/Precautions   Weight Bearing Precautions Per Order No   Other Precautions Cognitive;Multiple lines;Telemetry; Fall Risk   General   Additional Pertinent History pt presents to ED w acute encelphalopathy  pt w baseline dementia, confusion 2* to Alzheimers   Cognition   Overall Cognitive Status Impaired   Arousal/Participation Cooperative   Orientation Level Oriented to person;Oriented to place; Disoriented to time;Disoriented to situation  (general to place- "hospital", fully oriented to place w cues)   Memory Decreased recall of precautions;Decreased recall of recent events;Decreased short term memory   Following Commands Follows one step commands without difficulty   Comments pt pleasant  hx provided w A of dtr   RLE Assessment   RLE Assessment X   Strength RLE   R Hip Flexion 3+/5   R Hip ABduction 3+/5   R Hip ADduction 3+/5   R Knee Extension 4+/5   R Ankle Dorsiflexion 4+/5   LLE Assessment   LLE Assessment X   Strength LLE   L Hip Flexion 3+/5   L Hip ABduction 3+/5   L Hip ADduction 3+/5   L Knee Extension 4+/5   L Ankle Dorsiflexion 4+/5   Coordination   Movements are Fluid and Coordinated 0   Coordination and Movement Description instability, guarded   Sensation WFL   Bed Mobility   Supine to Sit 5  Supervision   Additional items HOB elevated; Bedrails; Increased time required;Verbal cues   Sit to Supine 5  Supervision   Additional items HOB elevated; Bedrails; Increased time required;Verbal cues   Additional Comments cues for instruction and safety   Transfers   Sit to Stand 4  Minimal assistance   Additional items Assist x 1;Bedrails; Increased time required;Verbal cues   Stand to Sit 4  Minimal assistance   Additional items Assist x 1;Bedrails; Increased time required;Verbal cues   Additional Comments min Ax1 for safety, cues for hand placement and instruction   Ambulation/Elevation   Gait pattern Improper Weight shift;Narrow DANNA; Decreased foot clearance; Short stride  (moderate instability)   Gait Assistance 4  Minimal assist   Additional items Assist x 1;Verbal cues; Tactile cues   Assistive Device Other (Comment)  (HHA)   Distance 1' FW/BW   Balance   Static Sitting Fair   Dynamic Sitting Fair -   Static Standing Poor +  (Ax1 for safety; noted BW lean in static standing)   Dynamic Standing Poor +  (HHA)   Ambulatory Poor +  (BW lean, HHA required)   Activity Tolerance   Activity Tolerance Patient tolerated treatment well   Nurse Made Aware yes; cleared for PT, updated end of session   Assessment   Prognosis Good   Problem List Decreased strength; Impaired balance;Decreased mobility; Decreased coordination;Decreased cognition; Impaired judgement;Decreased safety awareness   Assessment Bebo Ga is a 80 y o  female admitted to [address] on 1/21/2020 for Acute encephalopathy  Pt  has a past medical history of Depression, Hyperlipidemia, Hypertension, Osteoporosis, and Stroke ([facility] [address] )    PT was consulted and pt was seen on 1/22/2020 for mobility assessment and d/c planning  Pt presents high fall risk precautions, monitoring abn labs and vitals, PIV, continuous telemetry  Pt dtr and ANA present for session and assist in providing social hx and PLOF  Pt lives with her  at personal care portion of [facility]  Prior to admission pt was indep w amb no AD and would help push  in wc to and from dining torres  Pt require A to shower, indep dressing and A for IADLs (meals provided, cooking, cleaning)  Pt is currently functioning at a supervision assistance x1 level for bed mobility, minimum assistance x1 level for transfers, minimum assistance x1 level for ambulation with HHA  Pt demonstrated moderate instability upon standing w significant BW lean w cues for improved posture  Pt w difficulty maintaining balance w/o HHA during amb  Pt will benefit from continued skilled IP PT to address the above mentioned impairments  in order to maximize recovery and increase functional independence when completing mobility and ADLs  Currently PT recommendations for DME include RW pending trial next session  At this time PT recommendations for d/c are STR given decline from baseline fxn, fall risk, and social barriers     Barriers to Discharge Decreased caregiver support   Barriers to Discharge Comments increased A required from baseline   Goals   Patient Goals per dtr, would like for [facility] to [facility]   STG Expiration Date 02/05/20   Short Term Goal #1 1)  Pt will perform bed mobility with [place] demonstrating appropriate technique 100% of the time in order to improve function  2)  Perform all transfers with [place] demonstrating safe and appropriate technique 100% of the time in order to improve ability to negotiate safely in home environment  3) Amb with least restrictive AD > 100'x1 with mod I in order to demonstrate ability to negotiate in home environment  4)  Improve overall strength and balance 1/2 grade in order to optimize ability to perform functional tasks and reduce fall risk  5) Increase activity tolerance to 45 minutes in order to improve endurance to functional tasks  6) PT for ongoing patient and family/caregiver education, DME needs and d/c planning in order to promote highest level of function in least restrictive environment  PT Treatment Day 0   Plan   Treatment/Interventions Functional transfer training;LE strengthening/ROM; Therapeutic exercise;Cognitive reorientation;Patient/family training;Equipment eval/education; Bed mobility;Gait training;Continued evaluation;Spoke to nursing;Family   PT Frequency Other (Comment)  (3-5/wk)   Recommendation   Recommendation Short-term skilled PT   Equipment Recommended Walker   PT - OK to Discharge Yes   Additional Comments when medically stable   Modified [place] Scale   Modified [place] Scale 4   Barthel Index   Feeding 10   Bathing 0   Grooming Score 5   Dressing Score 5   Bladder Score 10   Bowels Score 10   Toilet Use Score 5   Transfers (Bed/Chair) Score 10   Mobility (Level Surface) Score 0   Stairs Score 0   Barthel Index Score 55   History: co - morbidities, age, social background (personal care at [facility], A in  mobility for )  fall risk, use of assistive device, assist for adl's, cognition (Alzheimers w dementia, confusion), multiple lines  Exam: impairments in systems including musculoskeletal (ROM, strength, posture), neuromuscular (balance, gait, transfers, motor function), cardiac, pulmonary, cognition  Clinical: unstable/unpredictable  Complexity:high      Earlean Outlaw, PT

## 2020-01-22 NOTE — DISCHARGE INSTR - AVS FIRST PAGE
[facility]  [facility],    It was a pleasure caring for you at [facility] in [facility]  You were evaluated by Dr Claudia Samayoa and Dr Gail Ramey (Neurology)  Your medications have been restarted, and there is no evidence of any acute neurologic abnormality  Please continue regular follow-up  You have been started on a new medication called Procardia 30 mg, please follow up with your PCP for blood pressure titration  Thank you for choosing [facility] for your healthcare needs  If I can be of any assistance in the future, please feel free to contact me        Dr Claudia Samayoa and Michael Samuel RN

## 2020-01-22 NOTE — ED NOTES
Pt refusing to take 0900 medications  Pt very agitated stating "you kidnapped me and stole me from off the streets, my parents don't even know I'm here" [facility] messed via amion, awaiting call back        Antony Naqvi RN  01/22/20 4407

## 2020-01-22 NOTE — ASSESSMENT & PLAN NOTE
-patient received 20 mg of IV hydralazine in the ER and will now be given IV labetalol  -continue Imdur, Toprol-XL  -continue IV hydralazine as needed  -Start Procardia XL 30mg daily

## 2020-01-22 NOTE — ED PROVIDER NOTES
History  Chief Complaint   Patient presents with    Hypoglycemia - Symptomatic     AMS indicated by staff on scene and then reported by EMS  Per staff, prior to her leaving the facility, she appeared more alert and oriented  Pt is an 80year old female with a PMH of CVA, hypertension, hyperlipidemia presenting with AMS  Pt is demented and confused at baseline  But, according to nursing home she was more confused than baseline  Called EMS  EMS states her glucose was 50 and she was given and AMP of dextrose  According to EMS, she has improved since initial assessment  Tech states she has been improving since being in the ED  Pt is pleasantly demented and has no complaints at this time  She is alert to name, and understands she is in the hospital and came from the nursing home but is unsure why  Pt follows commands and moves all extremities  Prior to Admission Medications   Prescriptions Last Dose Informant Patient Reported? Taking?    FLUoxetine (PROzac) 20 mg capsule   Yes Yes   Sig: Take 20 mg by mouth daily   Polyethylene Glycol 4500 (BASE D POLYETHYLENE GLYCOL) POWD   Yes Yes   Sig: Take 17 g by mouth daily   QUEtiapine (SEROquel) 25 mg tablet   Yes Yes   Sig: Take 25 mg by mouth   alendronate (FOSAMAX) 70 mg tablet   Yes Yes   Sig: Take 70 mg by mouth every 7 days   atorvastatin (LIPITOR) 40 mg tablet   Yes Yes   Sig: Take 20 mg by mouth daily   calcium-vitamin D (OYST-PILY D) 250-125 MG-UNIT per tablet   Yes Yes   Sig: Take 1 tablet by mouth daily   cholecalciferol (VITAMIN D3) 1,000 units tablet   Yes Yes   Sig: Take 2,000 Units by mouth daily   clopidogrel (PLAVIX) 75 mg tablet   Yes Yes   Sig: Take 75 mg by mouth daily   isosorbide mononitrate (ISMO,MONOKET) 20 mg tablet   Yes Yes   Sig: Take 30 mg by mouth 2 (two) times a day   melatonin 3 mg   Yes Yes   Sig: Take 3 mg by mouth   metoprolol succinate (TOPROL-XL) 25 mg 24 hr tablet   Yes Yes   Sig: Take 25 mg by mouth daily   traZODone (DESYREL) 50 mg tablet   Yes Yes   Sig: Take 50 mg by mouth      Facility-Administered Medications: None       Past Medical History:   Diagnosis Date    Depression     Hyperlipidemia     Hypertension     Osteoporosis     Stroke [facility])        History reviewed  No pertinent surgical history  History reviewed  No pertinent family history  I have reviewed and agree with the history as documented  Social History     Tobacco Use    Smoking status: Never Smoker   Substance Use Topics    Alcohol use: Not Currently    Drug use: Never        Review of Systems   Unable to perform ROS: Dementia       Physical Exam  Physical Exam   Constitutional: She appears well-developed and well-nourished  No distress  HENT:   Head: Normocephalic and atraumatic  Right Ear: External ear normal    Left Ear: External ear normal    Nose: Nose normal    Mouth/Throat: Oropharynx is clear and moist  No oropharyngeal exudate  Eyes: Pupils are equal, round, and reactive to light  Conjunctivae and EOM are normal    Neck: Normal range of motion  Neck supple  Cardiovascular: Normal rate, regular rhythm, normal heart sounds and intact distal pulses  Pulmonary/Chest: Effort normal and breath sounds normal    Abdominal: Soft  Bowel sounds are normal  She exhibits no distension  There is no tenderness  Musculoskeletal: Normal range of motion  Neurological: She is alert  She has normal strength  No cranial nerve deficit or sensory deficit  She exhibits normal muscle tone  Coordination normal    Non-focal neuro exam    Skin: Skin is warm and dry  She is not diaphoretic         Vital Signs  ED Triage Vitals   Temperature Pulse Respirations Blood Pressure SpO2   01/21/20 1812 01/21/20 1812 01/21/20 1812 01/21/20 1812 01/21/20 1812   98 6 °F (37 °C) 61 20 (!) 204/86 96 %      Temp Source Heart Rate Source Patient Position - Orthostatic VS BP Location FiO2 (%)   01/21/20 1812 01/21/20 1812 01/21/20 1812 01/21/20 1812 --   Temporal Monitor Lying Left arm       Pain Score       01/21/20 2109       No Pain           Vitals:    01/21/20 2045 01/21/20 2109 01/21/20 2125 01/21/20 2217   BP: (!) 189/74 (!) 184/61 (!) 199/75 (!) 201/93   Pulse: 58 63 73 75   Patient Position - Orthostatic VS:  Lying Lying Lying         Visual Acuity      ED Medications  Medications   Labetalol HCl (NORMODYNE) injection 10 mg (has no administration in time range)   sodium chloride 0 9 % bolus 1,000 mL (0 mL Intravenous Stopped 1/21/20 2045)   hydrALAZINE (APRESOLINE) injection 10 mg (10 mg Intravenous Given 1/21/20 1919)   hydrALAZINE (APRESOLINE) injection 10 mg (10 mg Intravenous Given 1/21/20 2048)       Diagnostic Studies  Results Reviewed     Procedure Component Value Units Date/Time    Fingerstick Glucose (POCT) [[id number]]  (Abnormal) Collected:  01/21/20 2124    Lab Status:  Final result Updated:  01/21/20 2125     POC Glucose 152 mg/dl     Urine Microscopic [[id number]]  (Abnormal) Collected:  01/21/20 1926    Lab Status:  Final result Specimen:  Urine, Clean Catch Updated:  01/21/20 2030     RBC, UA 1-2 /hpf      WBC, UA 2-4 /hpf      Epithelial Cells Occasional /hpf      Bacteria, UA Occasional /hpf     Basic metabolic panel [[id number]]  (Abnormal) Collected:  01/21/20 1920    Lab Status:  Final result Specimen:  Blood from Arm, Right Updated:  01/21/20 1956     Sodium 142 mmol/L      Potassium 3 8 mmol/L      Chloride 103 mmol/L      CO2 29 mmol/L      ANION GAP 10 mmol/L      BUN 29 mg/dL      Creatinine 1 17 mg/dL      Glucose 115 mg/dL      Calcium 8 6 mg/dL      eGFR 42 ml/min/1 73sq m     Narrative:       Gerald guidelines for Chronic Kidney Disease (CKD):     Stage 1 with normal or high GFR (GFR > 90 mL/min/1 73 square meters)    Stage 2 Mild CKD (GFR = 60-89 mL/min/1 73 square meters)    Stage 3A Moderate CKD (GFR = 45-59 mL/min/1 73 square meters)    Stage 3B Moderate CKD (GFR = 30-44 mL/min/1 73 square meters)    Stage 4 Severe CKD (GFR = 15-29 mL/min/1 73 square meters)    Stage 5 End Stage CKD (GFR <15 mL/min/1 73 square meters)  Note: GFR calculation is accurate only with a steady state creatinine    CBC and differential [[id number]]  (Abnormal) Collected:  01/21/20 1920    Lab Status:  Final result Specimen:  Blood from Arm, Right Updated:  01/1933     WBC 11 27 Thousand/uL      RBC 4 61 Million/uL      Hemoglobin 12 0 g/dL      Hematocrit 39 8 %      MCV 86 fL      MCH 26 0 pg      MCHC 30 2 g/dL      RDW 14 3 %      MPV 10 5 fL      Platelets 225 Thousands/uL      nRBC 0 /100 WBCs      Neutrophils Relative 78 %      Immat GRANS % 0 %      Lymphocytes Relative 15 %      Monocytes Relative 6 %      Eosinophils Relative 1 %      Basophils Relative 0 %      Neutrophils Absolute 8 73 Thousands/µL      Immature Grans Absolute 0 05 Thousand/uL      Lymphocytes Absolute 1 65 Thousands/µL      Monocytes Absolute 0 72 Thousand/µL      Eosinophils Absolute 0 10 Thousand/µL      Basophils Absolute 0 02 Thousands/µL     POCT urinalysis dipstick [[id number]]  (Abnormal) Resulted:  01/21/20 1929    Lab Status:  Final result Specimen:  Urine Updated:  01/21/20 1929    Urine Macroscopic, POC [[id number]]  (Abnormal) Collected:  01/21/20 1926    Lab Status:  Final result Specimen:  Urine Updated:  01/21/20 1927     Color, UA Yellow     Clarity, UA Clear     pH, UA 7 0     Leukocytes, UA Negative     Nitrite, UA Negative     Protein,  (2+) mg/dl      Glucose, UA Negative mg/dl      Ketones, UA Negative mg/dl      Urobilinogen, UA 0 2 E U /dl      Bilirubin, UA Negative     Blood, UA Trace     Specific [place], UA 1 020    Narrative:       CLINITEK RESULT    Fingerstick Glucose (POCT) [[id number]]  (Normal) Collected:  01/21/20 1813    Lab Status:  Final result Updated:  01/21/20 1813     POC Glucose 87 mg/dl                  CT head without contrast   Final Result by Geno Pretty MD (01/21 2240)      There is an approximately 2 3 x 1 4 cm mass to the left of the moriah, as described above  Please see discussion  This likely represents a meningioma  Nonemergent outpatient MRI is recommended for further evaluation, if there are no contraindications  There is moderate microangiopathic change  I personally discussed this study with Gumaro Starr on 1/21/2020 at 10:33 PM                            Workstation performed: [id number]                    Procedures  Procedures         ED Course  ED Course as of Jan 21 2256   Tue Jan 21, 2020 2213 Pt presented with AMS and hypoglycemia despite not being diabetic  Unknown if she did not eat or drink today  Per EMS she improved after amp  She was having conversation with me and moving extremities  Appeared to be at baseline per report  No UTI and blood work is normal  Her glucose has remained elevated  Family reported that she suddenly began to shake, as if she was cold  Denies seizure activity  She now is only whispering and not speaking, saying her mouth is dry  Family notes change in mentation  Otherwise, she is still alert and moving extremities, no other neuro deficit  Will CT head and admit to [facility]  BP remains high after IV hydralazine  Unknown if she took BP medications at night before arrival        2252 There is an approximately 2 3 x 1 4 cm mass to the left of the moriah, as described above  Please see discussion  This likely represents a meningioma    Nonemergent outpatient MRI is recommended for further evaluation, if there are no contraindications      There is moderate microangiopathic change                                      MDM      Disposition  Final diagnoses:   AMS (altered mental status)   Meningioma ([facility] [place] [address] )   Hypoglycemia     Time reflects when diagnosis was documented in both MDM as applicable and the Disposition within this note     Time User Action Codes Description Comment    1/21/2020 10:51 PM Denny Alexandra [R41 82] AMS (altered mental status)     1/21/2020 10:51 PM Gregoria Garcia Add [D32 9] Meningioma ([address] )     1/21/2020 10:51 PM Manuel GARCIA Add [E16 2] Hypoglycemia       ED Disposition     ED Disposition Condition Date/Time Comment    Admit Stable Tue Jan 21, 2020 10:55 PM Case was discussed with LUZ ELENA and the patient's admission status was agreed to be Admission Status: observation status to the service of Dr Kayden Carmona   Follow-up Information    None         Patient's Medications   Discharge Prescriptions    No medications on file     No discharge procedures on file      ED Provider  Electronically Signed by           River Matos PA-C  01/21/20 1663 [facility]  01/21/20 4031

## 2020-01-22 NOTE — UTILIZATION REVIEW
Initial Clinical Review    Admission: Date/Time/Statement:   Observation  1/21  @    4882  Orders Placed This Encounter   Procedures    Place in Observation (expected length of stay for this patient is less than two midnights)     Standing Status:   Standing     Number of Occurrences:   1     Order Specific Question:   Admitting Physician     Answer:   Arpit Landry [[id number]]     Order Specific Question:   Level of Care     Answer:   Med Surg [[id number]]     ED Arrival Information     Expected Arrival Acuity Means of Arrival Escorted By Service Admission Type    - 1/21/2020 18:07 Urgent Ambulance [facility] General Medicine Urgent    Arrival Complaint    hypoglycemia         Chief Complaint   Patient presents with    Hypoglycemia - Symptomatic     AMS indicated by staff on scene and then reported by EMS  Per staff, prior to her leaving the facility, she appeared more alert and oriented  Assessment/Plan: Late onset Alzheimer's disease without behavioral disturbance ([facility] [address] )  Assessment & Plan  -continue Seroquel and trazodone     Hypertensive urgency  Assessment & Plan  -patient received 20 mg of IV hydralazine in the ER and will now be given IV labetalol  -continue Imdur, Toprol-XL  -continue IV hydralazine as needed  -Start Procardia XL 30mg daily     * Acute encephalopathy  Assessment & Plan  -this is likely due to progression of the patient's dementia  I explained the natural course of dementia to the patient's family at bedside  The patient's hypoglycemia, blood glucose around 50, also could have contributed  -CBC and BMP unremarkable  -CT brain: There is an approximately 2 3 x 1 4 cm mass to the left of the moriah, as described above   Please see discussion   This likely represents a meningioma   Nonemergent outpatient MRI is recommended for further evaluation, if there are no contraindications  There is moderate microangiopathic change      Blair Fernandez is a 80 y o  female who presents with change in mental status  Earlier today the patient's nursing home it was reported that she was unable to speak  She was found have a blood glucose round 15 was given D50W  Her mentation improved to baseline  She then had another episode in the ER where she was having difficulty speaking and this resolved on its own  Patient has dimension is a poor historian  She denies any complaints at this time  I discussed the case with the patient's family at bedside and they are concerned about these episodes of acute encephalopathy  I specifically asked the patient's family about chest pain, shortness of breath, nausea, vomiting, diarrhea, fevers in they denied that the patient has had any of these symptoms    They did report the patient has not been eating as well recently  ED Triage Vitals   Temperature Pulse Respirations Blood Pressure SpO2   01/21/20 1812 01/21/20 1812 01/21/20 1812 01/21/20 1812 01/21/20 1812   98 6 °F (37 °C) 61 20 (!) 204/86 96 %      Temp Source Heart Rate Source Patient Position - Orthostatic VS BP Location FiO2 (%)   01/21/20 1812 01/21/20 1812 01/21/20 1812 01/21/20 1812 --   Temporal Monitor Lying Left arm       Pain Score       01/21/20 2109       No Pain        Wt Readings from Last 1 Encounters:   01/22/20 60 6 kg (133 lb 9 6 oz)     Additional Vital Signs:   01/22/20 1330    76  20  174/77Abnormal   98 %  None (Room air)  Lying   01/22/20 1230    66  18  175/76Abnormal   97 %  None (Room air)  Lying   01/22/20 1124    73  18  174/72Abnormal   98 %  None (Room air)  Lying   01/22/20 1023    72  16  146/65  96 %    Lying   01/22/20 0936        201/91Abnormal          01/22/20 0904    83    194/94Abnormal          01/22/20 0818    71  16  179/77Abnormal   97 %  None (Room air)  Lying   01/22/20 0515    70  16  179/79Abnormal   97 %  None (Room air)  Lying   01/22/20 0330    74  18  176/66Abnormal   97 %       01/22/20 0230        208/77Abnormal          01/22/20 0228    71  18 208/77Abnormal   95 %  None (Room air)  Lying   01/22/20 0218    68  20  177/74Abnormal   97 %  None (Room air)  Lying   01/21/20 2345    64  14  176/67Abnormal   96 %  None (Room air)  Lying   01/21/20 2217    75  18  201/93Abnormal   97 %  None (Room air)  Lying   01/21/20 2125    73  16  199/75Abnormal   97 %  None (Room air)  Lying   01/21/20 2109    63  18  184/61Abnormal   96 %  None (Room air)  Lying   01/21/20 2045    58    189/74Abnormal   95 %       01/21/20 1948    68  20  224/99Abnormal   96 %  None (Room air)  Sitting   01/21/20 1812  98 6 °F (37 °C)  61  20  204/86Abnormal   96 %  None (Room air)           Pertinent Labs/Diagnostic Test Results:   Ct  Head  ( 1/21)     There is an approximately 2 3 x 1 4 cm mass to the left of the moriah, as described above   Please see discussion   This likely represents a meningioma   Nonemergent outpatient MRI is recommended for further evaluation, if there are no contraindications  There is moderate microangiopathic change    Results from last 7 days   Lab Units 01/22/20  0517 01/21/20  1920   WBC Thousand/uL  --  11 27*   HEMOGLOBIN g/dL  --  12 0   HEMATOCRIT %  --  39 8   PLATELETS Thousands/uL 167 183   NEUTROS ABS Thousands/µL  --  8 73*         Results from last 7 days   Lab Units 01/21/20  1920   SODIUM mmol/L 142   POTASSIUM mmol/L 3 8   CHLORIDE mmol/L 103   CO2 mmol/L 29   ANION GAP mmol/L 10   BUN mg/dL 29*   CREATININE mg/dL 1 17   EGFR ml/min/1 73sq m 42   CALCIUM mg/dL 8 6         Results from last 7 days   Lab Units 01/22/20  0838 01/21/20 2124 01/21/20  1813   POC GLUCOSE mg/dl 130 152* 87     Results from last 7 days   Lab Units 01/21/20  1920   GLUCOSE RANDOM mg/dL 115           Results from last 7 days   Lab Units 01/21/20  1926   CLARITY UA  Clear   COLOR UA  Yellow   SPEC GRAV UA  1 020   PH UA  7 0   GLUCOSE UA mg/dl Negative   KETONES UA mg/dl Negative   BLOOD UA  Trace*   PROTEIN UA mg/dl 100 (2+)*   NITRITE UA  Negative BILIRUBIN UA  Negative   UROBILINOGEN UA E U /dl 0 2   LEUKOCYTES UA  Negative   WBC UA /hpf 2-4*   RBC UA /hpf 1-2*   BACTERIA UA /hpf Occasional   EPITHELIAL CELLS WET PREP /hpf Occasional         ED Treatment:   Medication Administration from 01/21/2020 1807 to 01/22/2020 1503       Date/Time Order Dose Route Action Action by Comments     01/21/2020 2045 sodium chloride 0 9 % bolus 1,000 mL 0 mL Intravenous Stopped Esvin Gil RN      01/21/2020 1918 sodium chloride 0 9 % bolus 1,000 mL 1,000 mL Intravenous [address] Esvin Gil RN      01/21/2020 1919 hydrALAZINE (APRESOLINE) injection 10 mg 10 mg Intravenous Given Esvin Gil RN      01/21/2020 2048 hydrALAZINE (APRESOLINE) injection 10 mg 10 mg Intravenous Given Esvin Gil RN      01/21/2020 2255 Labetalol HCl (NORMODYNE) injection 10 mg 10 mg Intravenous Given Esvin Gil RN      01/22/2020 0216 sodium chloride 0 9 % infusion 125 mL/hr Intravenous R Michelle Jameson [address], ASIA      01/22/2020 1154 atorvastatin (LIPITOR) tablet 20 mg 20 mg Oral Given Zach Asher RN      01/22/2020 7576 atorvastatin (LIPITOR) tablet 20 mg 0 mg Oral Hold Zach Asher RN      01/22/2020 1153 clopidogrel (PLAVIX) tablet 75 mg 75 mg Oral Given Zach Asher RN      01/22/2020 0944 clopidogrel (PLAVIX) tablet 75 mg 0 mg Oral Hold Zach Asher RN      01/22/2020 1152 FLUoxetine (PROzac) capsule 20 mg 20 mg Oral Given Zach Asher RN Pt refused prior administration     01/22/2020 0908 FLUoxetine (PROzac) capsule 20 mg 0 mg Oral Hold Rosemarie Curry RN Pt refusing at this time  Pt very agitated       01/22/2020 1157 isosorbide mononitrate (IMDUR) 24 hr tablet 30 mg 30 mg Oral Given Zach Asher RN BP: 174/72, HR: 76     01/22/2020 0944 isosorbide mononitrate (IMDUR) 24 hr tablet 30 mg 0 mg Oral Hold Zach Asher RN      01/22/2020 1155 NIFEdipine (PROCARDIA XL) 24 hr tablet 30 mg 30 mg Oral Given Zach Asher RN BP: 174/72, HR: 76 01/22/2020 0950 NIFEdipine (PROCARDIA XL) 24 hr tablet 30 mg 0 mg Oral Hold Alis Long, ASIA      01/22/2020 2065 metoprolol succinate (TOPROL-XL) 24 hr tablet 25 mg 25 mg Oral Given Byron Solo, ASIA      01/22/2020 1209 polyethylene glycol (MIRALAX) packet 17 g 17 g Oral Given Byron Solo, ASIA      01/22/2020 8601 polyethylene glycol (MIRALAX) packet 17 g 0 g Oral Hold Rebeca Guerrero, ASIA      01/22/2020 1306 traZODone (DESYREL) tablet 50 mg 0 mg Oral Hold Byron Solo, ASIA      01/22/2020 1457 enoxaparin (LOVENOX) subcutaneous injection 40 mg 40 mg Subcutaneous Given Byron Solo, ASIA      01/22/2020 8232 enoxaparin (LOVENOX) subcutaneous injection 40 mg 0 mg Subcutaneous Hold Rebeca Guerrero RN      01/22/2020 3280 hydrALAZINE (APRESOLINE) injection 10 mg 10 mg Intravenous Given Byron Solo RN      01/22/2020 0230 hydrALAZINE (APRESOLINE) injection 10 mg 10 mg Intravenous Given Nessa Dover RN      01/22/2020 0951 OLANZapine (ZyPREXA) tablet 5 mg 0 mg Oral Hold Rebeca Guerrero RN      01/22/2020 1007 haloperidol lactate (HALDOL) injection 5 mg 5 mg Intramuscular Given Byron Solo RN         Past Medical History:   Diagnosis Date    Depression     Hyperlipidemia     Hypertension     Osteoporosis     Stroke [facility])      Present on Admission:   Acute encephalopathy   Hypertensive urgency   Late onset Alzheimer's disease without behavioral disturbance ([facility] [address] )      Admitting Diagnosis: Hypoglycemia [E16 2]  Age/Sex: 80 y o  female  Admission Orders:  Scheduled Medications:    Medications:  atorvastatin 20 mg Oral Daily   clopidogrel 75 mg Oral Daily   enoxaparin 40 mg Subcutaneous Daily   FLUoxetine 20 mg Oral Daily   isosorbide mononitrate 30 mg Oral Daily   metoprolol succinate 25 mg Oral Daily   NIFEdipine 30 mg Oral Daily   OLANZapine 5 mg Oral Once   polyethylene glycol 17 g Oral Daily   traZODone 50 mg Oral HS     Continuous IV Infusions:    sodium chloride 125 mL/hr Intravenous Continuous     PRN Meds:    haloperidol lactate 5 mg Intramuscular Q6H PRN   hydrALAZINE 10 mg Intravenous Q6H PRN       IP CONSULT TO NEUROLOGY  IP CONSULT TO PSYCHIATRY     1:1  observation    Network Utilization Review Department  [email or web address]  ATTENTION: Please call with any questions or concerns to [phone] and carefully listen to the prompts so that you are directed to the right person  All voicemails are confidential   Reina Steve all requests for admission clinical reviews, approved or denied determinations and any other requests to dedicated fax number below belonging to the campus where the patient is receiving treatment   List of dedicated fax numbers for the Facilities:  [address] DENIALS (Administrative/Medical Necessity) [phone]   [address] (Maternity/NICU/Pediatrics) [phone]   [facility] [phone]   Charu Sweeney [phone]   Talon Dudley [phone]   Anders Buerger [phone]   [address] [place] [address] 1525  [phone]   [facility]  [phone]   [address] [facility] [address]  [address] [address] [address] [facility] [phone]

## 2020-01-22 NOTE — PLAN OF CARE
Problem: PHYSICAL THERAPY ADULT  Goal: Performs mobility at highest level of function for planned discharge setting  See evaluation for individualized goals  Description  Treatment/Interventions: Functional transfer training, LE strengthening/ROM, Therapeutic exercise, Cognitive reorientation, Patient/family training, Equipment eval/education, Bed mobility, Gait training, Continued evaluation, Spoke to nursing, Family  Equipment Recommended: Alistair Fall       See flowsheet documentation for full assessment, interventions and recommendations  Note:   Prognosis: Good  Problem List: Decreased strength, Impaired balance, Decreased mobility, Decreased coordination, Decreased cognition, Impaired judgement, Decreased safety awareness  Assessment: Ivanna Aviles is a 80 y o  female admitted to [facility] on 1/21/2020 for Acute encephalopathy  Pt  has a past medical history of Depression, Hyperlipidemia, Hypertension, Osteoporosis, and Stroke ([facility] [address] )    PT was consulted and pt was seen on 1/22/2020 for mobility assessment and d/c planning  Pt presents high fall risk precautions, monitoring abn labs and vitals, PIV, continuous telemetry  Pt dtr and ANA present for session and assist in providing social hx and PLOF  Pt lives with her  at personal care [employer] [facility] [employer]  Prior to admission pt was indep w amb no AD and would help push  in wc to and from dining torres  Pt require A to shower, indep dressing and A for IADLs (meals provided, cooking, cleaning)  Pt is currently functioning at a supervision assistance x1 level for bed mobility, minimum assistance x1 level for transfers, minimum assistance x1 level for ambulation with HHA  Pt demonstrated moderate instability upon standing w significant BW lean w cues for improved posture  Pt w difficulty maintaining balance w/o HHA during amb    Pt will benefit from continued skilled IP PT to address the above mentioned impairments  in order to maximize recovery and increase functional independence when completing mobility and ADLs  Currently PT recommendations for DME include RW pending trial next session  At this time PT recommendations for d/c are STR given decline from baseline fxn, fall risk, and social barriers  Barriers to Discharge: Decreased caregiver support  Barriers to Discharge Comments: increased A required from baseline  Recommendation: Short-term skilled PT     PT - OK to Discharge: Yes    See flowsheet documentation for full assessment

## 2020-01-23 NOTE — ASSESSMENT & PLAN NOTE
-this is likely due to progression of the patient's dementia  I explained the natural course of dementia to the patient's family at bedside  The patient's hypoglycemia, blood glucose around 50, also could have contributed  -CBC and BMP unremarkable  -CT brain: There is an approximately 2 3 x 1 4 cm mass to the left of the moriah, as described above   Please see discussion   This likely represents a meningioma       Nonemergent outpatient MRI is recommended for further evaluation, if there are no contraindications  There is moderate microangiopathic change  - Neurology consultation placed

## 2020-01-23 NOTE — ASSESSMENT & PLAN NOTE
-patient received 20 mg of IV hydralazine in the ER and will now be given IV labetalol  -continue Imdur, Toprol-XL  -continue IV hydralazine as needed  -Start Procardia XL 30mg daily- Better controlled

## 2020-01-23 NOTE — DISCHARGE SUMMARY
Discharge- Chet January 4/21/1931, 80 y o  female MRN: [id number]    Unit/Bed#: ED 25 Encounter: [id number]    Primary Care Provider: No primary care provider on file  Date and time admitted to hospital: 1/21/2020  6:08 PM        Late onset Alzheimer's disease without behavioral disturbance ([facility] [address] )  Assessment & Plan  -continue Seroquel and trazodone- Outpatient f/u    Hypertensive urgency  Assessment & Plan  -patient received 20 mg of IV hydralazine in the ER and will now be given IV labetalol  -continue Imdur, Toprol-XL  -continue IV hydralazine as needed  -Start Procardia XL 30mg daily- Better controlled    * Acute encephalopathy  Assessment & Plan  -this is likely due to progression of the patient's dementia  I explained the natural course of dementia to the patient's family at bedside  The patient's hypoglycemia, blood glucose around 50, also could have contributed  -CBC and BMP unremarkable  -CT brain: There is an approximately 2 3 x 1 4 cm mass to the left of the moriah, as described above   Please see discussion   This likely represents a meningioma       Nonemergent outpatient MRI is recommended for further evaluation, if there are no contraindications  There is moderate microangiopathic change  - Neurology consultation placed  Admitting Provider:  Trisha Hills MD  Discharge Provider:  Julissa Brothers DO  Admission Date: 1/21/2020       Discharge Date: 1/22/2020   LOS: 0  Primary Care Physician at Discharge: No primary care provider on file  None    HOSPITAL COURSE:  Chet January is a 80 y o  female who presented with chest and mental status and hypertensive urgency  She was found the have a 2 3 x 1 4 cm mass to the  Left moriah and evaluated in consultation by the Neurology service  She was started on Procardia in addition to her blood pressure medications and her BP did improve  She was cleared from a neurological standpoint and subsequently discharged home with outpatient follow up      At the time of discharge the patient was without complaint  Her family reported she was back at baseline mental status and she discharged to her living facility   All questions were answered to the patient's satisfaction  Patient will require outpatient MRI of the brain per CT - if desired by the family  DISCHARGE DIAGNOSES  Late onset Alzheimer's disease without behavioral disturbance ([facility] [address] )  Assessment & Plan  -continue Seroquel and trazodone- Outpatient f/u    Hypertensive urgency  Assessment & Plan  -patient received 20 mg of IV hydralazine in the ER and will now be given IV labetalol  -continue Imdur, Toprol-XL  -continue IV hydralazine as needed  -Start Procardia XL 30mg daily- Better controlled    * Acute encephalopathy  Assessment & Plan  -this is likely due to progression of the patient's dementia  I explained the natural course of dementia to the patient's family at bedside  The patient's hypoglycemia, blood glucose around 50, also could have contributed  -CBC and BMP unremarkable  -CT brain: There is an approximately 2 3 x 1 4 cm mass to the left of the moriah, as described above   Please see discussion   This likely represents a meningioma       Nonemergent outpatient MRI is recommended for further evaluation, if there are no contraindications  There is moderate microangiopathic change  - Neurology consultation placed  CONSULTING PROVIDERS   IP CONSULT TO NEUROLOGY    PROCEDURES PERFORMED  * No surgery found *    RADIOLOGY RESULTS  Ct Head Without Contrast    Result Date: 1/21/2020  Narrative: CT BRAIN - WITHOUT CONTRAST INDICATION:   Altered mental status  Altered mental status, confusion  History of hypertension  COMPARISON:  None available  TECHNIQUE:  CT examination of the brain was performed  In addition to axial images, coronal 2D reformatted images were created and submitted for interpretation  Radiation dose length product (DLP) for this visit:  60 443 74 88 mGy-cm     This examination, like all CT scans performed in the [facility], was performed utilizing techniques to minimize radiation dose exposure, including the use of iterative reconstruction and automated exposure control  IMAGE QUALITY:  Diagnostic  FINDINGS: PARENCHYMA: Decreased attenuation is noted in periventricular and subcortical white matter demonstrating an appearance that is statistically most likely to represent moderate microangiopathic change  No definite CT signs of acute infarction  There is an approximately 2 3 x 1 4 cm slightly hyperdense mass to the left of the moriah, along the medial aspect of the left petrous bone, best demonstrated on series 2 image 18 and series 5 image 18  This causes some distortion upon the left ambient cistern  There is no significant associated edema  This most likely represents a meningioma  Nonemergent outpatient MRI is recommended for further characterization, if there are no contraindications  There  is no midline shift  No acute parenchymal hemorrhage  VENTRICLES AND EXTRA-AXIAL SPACES:  As described above  No hydrocephalus  VISUALIZED ORBITS AND PARANASAL SINUSES:  Unremarkable  CALVARIUM AND EXTRACRANIAL SOFT TISSUES:  Normal      Impression: There is an approximately 2 3 x 1 4 cm mass to the left of the moriah, as described above  Please see discussion  This likely represents a meningioma  Nonemergent outpatient MRI is recommended for further evaluation, if there are no contraindications  There is moderate microangiopathic change    I personally discussed this study with Chari Nielsen on 1/21/2020 at 10:33 PM  Workstation performed: [id number]       LABS  Results from last 7 days   Lab Units 01/22/20  0517 01/21/20  1920   WBC Thousand/uL  --  11 27*   HEMOGLOBIN g/dL  --  12 0   HEMATOCRIT %  --  39 8   MCV fL  --  86   PLATELETS Thousands/uL 167 183     Results from last 7 days   Lab Units 01/21/20  1920   SODIUM mmol/L 142   POTASSIUM mmol/L 3 8   CHLORIDE mmol/L 103   CO2 mmol/L 29   BUN mg/dL 29*   CREATININE mg/dL 1 17   CALCIUM mg/dL 8 6   EGFR ml/min/1 73sq m 42   GLUCOSE RANDOM mg/dL 115                  Results from last 7 days   Lab Units 01/22/20  0838 01/21/20 2124 01/21/20 1813   POC GLUCOSE mg/dl 130 152* 87                       Cultures:   Results from last 7 days   Lab Units 01/21/20  1926   COLOR UA  Yellow   CLARITY UA  Clear   SPEC GRAV UA  1 020   PH UA  7 0   LEUKOCYTES UA  Negative   NITRITE UA  Negative   GLUCOSE UA mg/dl Negative   KETONES UA mg/dl Negative   BILIRUBIN UA  Negative   BLOOD UA  Trace*      Results from last 7 days   Lab Units 01/21/20 1926   RBC UA /hpf 1-2*   WBC UA /hpf 2-4*   EPITHELIAL CELLS WET PREP /hpf Occasional   BACTERIA UA /hpf Occasional            PHYSICAL EXAM:  Vitals:   Blood Pressure: 155/72 (01/22/20 1630)  Pulse: 74 (01/22/20 1630)  Temperature: 98 6 °F (37 °C) (01/21/20 1812)  Temp Source: Temporal (01/21/20 1812)  Respirations: 16 (01/22/20 1630)  Height: 5' 4" (162 6 cm) (01/22/20 0515)  Weight - Scale: 60 6 kg (133 lb 9 6 oz) (01/22/20 0515)  SpO2: 98 % (01/22/20 1330)      General: well appearing, no acute distress  HEENT: atraumatic, PERRLA, moist mucosa, normal pharynx, normal tonsils and adenoids, normal tongue, no fluid in sinuses  Neck: Trachea midline, no carotid bruit, no masses  Respiratory: normal chest wall expansion, CTA B, no r/r/w, no rubs  Cardiovascular: RRR, no m/r/g, Normal S1 and S2  Abdomen: Soft, non-tender, non-distended, normal bowel sounds in all quadrants, no hepatosplenomegaly, no tympany  Rectal: deferred  Musculoskeletal: normal ROM in upper and lower extremities  Integumentary: warm, dry, and pink, with no rash, purpura, or petechia  Heme/Lymph: no lymphadenopathy, no bruises  Neurological: Cranial Nerves II-XII grossly intact, no tics, normal sensation to pressure and light touch  Psychiatric: apparent dementia      Discharge Disposition: Home/Self Care      Test Results Pending at Discharge:           Medications   · Summary of Medication Adjustments made as a result of this hospitalization: Procardia 30 mg XL  · Medication Dosing Tapers - Please refer to Discharge Medication List for details on any medication dosing tapers (if applicable to patient)  · Discharge Medication List: See after visit summary for reconciled discharge medications  Diet restrictions: Activity restrictions: No strenuous activity  Discharge Condition: good    Outpatient Follow-Up and Discharge Instructions  See after visit summary section titled Discharge Instructions for information provided to patient and family  Code Status: Prior  Discharge Statement   I spent 35 minutes discharging the patient  This time was spent on the day of discharge  Greater than 50% of total time was spent with the patient and / or family counseling and / or coordination of care  ** Please Note: This note has been constructed using a voice recognition system   **